# Patient Record
Sex: MALE | Race: WHITE | HISPANIC OR LATINO | Employment: PART TIME | URBAN - METROPOLITAN AREA
[De-identification: names, ages, dates, MRNs, and addresses within clinical notes are randomized per-mention and may not be internally consistent; named-entity substitution may affect disease eponyms.]

---
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
## 2021-01-17 ENCOUNTER — HOSPITAL ENCOUNTER (EMERGENCY)
Facility: MEDICAL CENTER | Age: 21
End: 2021-01-17
Attending: EMERGENCY MEDICINE
Payer: COMMERCIAL

## 2021-01-17 VITALS
TEMPERATURE: 97.3 F | RESPIRATION RATE: 21 BRPM | OXYGEN SATURATION: 96 % | DIASTOLIC BLOOD PRESSURE: 55 MMHG | HEART RATE: 95 BPM | SYSTOLIC BLOOD PRESSURE: 117 MMHG

## 2021-01-17 DIAGNOSIS — F10.921 ACUTE ALCOHOLIC INTOXICATION WITH DELIRIUM (HCC): ICD-10-CM

## 2021-01-17 PROCEDURE — 96375 TX/PRO/DX INJ NEW DRUG ADDON: CPT

## 2021-01-17 PROCEDURE — 700111 HCHG RX REV CODE 636 W/ 250 OVERRIDE (IP): Performed by: EMERGENCY MEDICINE

## 2021-01-17 PROCEDURE — 96374 THER/PROPH/DIAG INJ IV PUSH: CPT

## 2021-01-17 PROCEDURE — 99285 EMERGENCY DEPT VISIT HI MDM: CPT

## 2021-01-17 RX ORDER — ONDANSETRON 2 MG/ML
4 INJECTION INTRAMUSCULAR; INTRAVENOUS ONCE
Status: COMPLETED | OUTPATIENT
Start: 2021-01-17 | End: 2021-01-17

## 2021-01-17 RX ORDER — NALOXONE HYDROCHLORIDE 1 MG/ML
2 INJECTION INTRAMUSCULAR; INTRAVENOUS; SUBCUTANEOUS ONCE
Status: COMPLETED | OUTPATIENT
Start: 2021-01-17 | End: 2021-01-17

## 2021-01-17 RX ORDER — NALOXONE HYDROCHLORIDE 0.4 MG/ML
2 INJECTION, SOLUTION INTRAMUSCULAR; INTRAVENOUS; SUBCUTANEOUS ONCE
Status: DISCONTINUED | OUTPATIENT
Start: 2021-01-17 | End: 2021-01-17

## 2021-01-17 RX ADMIN — NALOXONE HYDROCHLORIDE 2 MG: 1 INJECTION PARENTERAL at 05:01

## 2021-01-17 RX ADMIN — ONDANSETRON 4 MG: 2 INJECTION INTRAMUSCULAR; INTRAVENOUS at 05:01

## 2021-01-17 NOTE — ED NOTES
Pt given discharge paperwork and verbalizes understanding. PIV removed, and pt shown to exit and picked up by his parents from ER lobby, pt leaving in no distress.

## 2021-01-17 NOTE — ED NOTES
This RN assisted the pt to the restroom to void. Pt was able to walk with steady gait without need for assistance. This RN confirmed with MD Maxwell that he is okay with pt going home with parents now that he is sober enough to walk on his own.

## 2021-01-17 NOTE — ED NOTES
Pt sleeping comfortably in bed. Even and unlabored respirations noted, VSS on the monitor on room air

## 2021-01-17 NOTE — ED NOTES
This RN woke pt up with light shaking and verbal stimuli. Pt woke up and was confused about where he was and how he got here. Pt is alert to self, place, and time after reorientation to where he is. Pt states that he wants to call his parents to pick him up.

## 2021-01-17 NOTE — ED PROVIDER NOTES
ED Provider Note    Scribed for Ivan Maxwell M.D. by Hugh Hernandez. 1/17/2021  4:42 AM    Primary care provider: Luca Munguia D.O.  Means of arrival: EMS  History obtained from: EMS/Patient  History limited by: Alcohol intoxication    CHIEF COMPLAINT  Chief Complaint   Patient presents with   • Alcohol Intoxication   • N/V     Found by passer-by in parking lot vomiting.        HPI  Roderick Castaneda is a 20 y.o. male who presents to the Emergency Department brought in by EMS for acute alcohol intoxication. Per EMS, the patient was found by a passer-by vomiting in a parking lot. He is responsive to pain, but otherwise does not answer questions. There are no obvious signs of trauma.    HPI limited secondary to patient's alcohol intoxication     REVIEW OF SYSTEMS  Pertinent positives include alcohol intoxication.   Pertinent negatives include no obvious signs of trauma.    See HPI for further details.     ROS limited secondary to patient's alcohol intoxication       PAST MEDICAL HISTORY   has a past medical history of Anesthesia (08/2018), ASTHMA (08/2018), Cold, and Pain (08/2018).    SURGICAL HISTORY   has a past surgical history that includes acl repair (Left, 07/21/2016); dental surgery (06/2017); shoulder arthroscopy w/ slap / labral repair (Left, 8/30/2018); and shoulder arthroscopy w/ rotator cuff repair (8/30/2018).    SOCIAL HISTORY  Social History     Tobacco Use   • Smoking status: Never Smoker   • Smokeless tobacco: Never Used   Substance Use Topics   • Alcohol use: No   • Drug use: No      Social History     Substance and Sexual Activity   Drug Use No       FAMILY HISTORY  No family history on file.    CURRENT MEDICATIONS  Current Outpatient Medications   Medication Instructions   • albuterol (PROAIR HFA) 108 (90 Base) MCG/ACT Aero Soln inhalation aerosol 2 Puffs, Inhalation, EVERY 6 HOURS PRN   • amphetamine-dextroamphetamine (ADDERALL XR) 20 MG per XR capsule 20 mg, Oral, EVERY DAY          ALLERGIES  No Known Allergies    PHYSICAL EXAM  VITAL SIGNS: /55   Pulse 87   Temp 36.3 °C (97.3 °F) (Temporal)   Resp 19   SpO2 95%     Nursing note and vitals reviewed.  Constitutional: Well-developed and well-nourished.  HENT: Head is normocephalic and atraumatic. Oropharynx is clear and moist without exudate or erythema.   Eyes: Pupils are equal, round, and reactive to light. Conjunctiva are normal.   Cardiovascular: Normal rate and regular rhythm. No murmur heard. Normal radial pulses.  Pulmonary/Chest: Breath sounds normal. No wheezes or rales.   Abdominal: Soft and non-tender. No distention    Musculoskeletal: Extremities exhibit normal range of motion without edema or tenderness.   Neurological: Awake, alert and oriented to person, place, and time. No focal deficits noted.  Skin: Skin is warm and dry. No rash.   Psychiatric: Grossly intoxicated.     COURSE & MEDICAL DECISION MAKING  Nursing notes, VS, PMSFHx reviewed in chart.     4:42 AM - Patient seen and examined at bedside. Patient will be treated with Zofran 4 mg and Narcan 2 mg. The differential diagnoses include but are not limited to: Alcohol intoxication    8:52 AM - Patient road tested by RN; patient able to ambulate with a steady gait and would like to call his parents and go home. Return precautions were discussed with the patient, and they were cleared for discharge at this time. Patient was understanding and agreeable to discharge.    Patient presents today with alcohol intoxication.  He sobered up in the emergency department.  He is able to ambulate with a stable gait.  Discharged home in improved condition.    The patient will return for new or worsening symptoms and is stable at the time of discharge.    The patient is referred to a primary physician for blood pressure management, diabetic screening, and for all other preventative health concerns.    DISPOSITION:  Patient will be discharged home in stable  condition.    FOLLOW UP:  Luca Munguia D.O.  [address] [place] [id number] [phone]    Schedule an appointment as soon as possible for a visit       [facility], Emergency Dept  [address] [facility] [address] [phone]    If symptoms worsen      OUTPATIENT MEDICATIONS:  New Prescriptions    No medications on file       FINAL IMPRESSION  1. Acute alcoholic intoxication with delirium (HCC)          IHugh (Scribe), am scribing for, and in the presence of, Ivan Maxwell M.D..    Electronically signed by: Hugh Hernandez (Bessibvera), 1/17/2021    IIvan M.D. personally performed the services described in this documentation, as scribed by Hugh Hernandez in my presence, and it is both accurate and complete. C.    The note accurately reflects work and decisions made by me.  Ivan Maxwell M.D.  1/17/2021  10:34 AM

## 2022-04-11 ENCOUNTER — OFFICE VISIT (OUTPATIENT)
Dept: URGENT CARE | Facility: PHYSICIAN GROUP | Age: 22
End: 2022-04-11
Payer: COMMERCIAL

## 2022-04-11 VITALS
HEART RATE: 68 BPM | HEIGHT: 72 IN | RESPIRATION RATE: 18 BRPM | SYSTOLIC BLOOD PRESSURE: 118 MMHG | TEMPERATURE: 97.5 F | WEIGHT: 180 LBS | OXYGEN SATURATION: 98 % | DIASTOLIC BLOOD PRESSURE: 72 MMHG | BODY MASS INDEX: 24.38 KG/M2

## 2022-04-11 DIAGNOSIS — L03.316 CELLULITIS OF UMBILICUS: ICD-10-CM

## 2022-04-11 PROCEDURE — 99203 OFFICE O/P NEW LOW 30 MIN: CPT | Performed by: FAMILY MEDICINE

## 2022-04-11 RX ORDER — CEPHALEXIN 500 MG/1
500 CAPSULE ORAL 4 TIMES DAILY
Qty: 20 CAPSULE | Refills: 0 | Status: SHIPPED | OUTPATIENT
Start: 2022-04-11 | End: 2022-04-16

## 2022-04-11 ASSESSMENT — ENCOUNTER SYMPTOMS
COUGH: 0
DIZZINESS: 0
SORE THROAT: 0
SHORTNESS OF BREATH: 0
FEVER: 0
NAUSEA: 0
MYALGIAS: 0
CHILLS: 0
EASY BRUISING: 1
VOMITING: 0

## 2022-04-11 NOTE — PROGRESS NOTES
Subjective:   Roderick Castaneda is a 21 y.o. male who presents for Bleeding/Bruising (Belly button for 3 months. )        Rash  This is a new (Complains of redness, swelling at the umbilicus over the past 3 months, denies recollection of specific injury, reports intermittent drainage) problem. The problem is unchanged. Location: Umbilicus. The rash is characterized by redness and swelling. He was exposed to nothing. Pertinent negatives include no cough, fever, shortness of breath, sore throat or vomiting. Treatments tried: Routine skin hygiene, reports previous wound culture performed at outside facility. The treatment provided no relief.     PMH:  has a past medical history of Anesthesia (08/2018), ASTHMA (08/2018), Cold, and Pain (08/2018).  MEDS:   Current Outpatient Medications:   •  cephALEXin (KEFLEX) 500 MG Cap, Take 1 Capsule by mouth 4 times a day for 5 days., Disp: 20 Capsule, Rfl: 0  •  mupirocin (BACTROBAN) 2 % Ointment, Apply 1 Application topically 2 times a day for 14 days., Disp: 30 g, Rfl: 1  •  albuterol (PROAIR HFA) 108 (90 Base) MCG/ACT Aero Soln inhalation aerosol, Inhale 2 Puffs by mouth every 6 hours as needed for Shortness of Breath., Disp: 8.5 g, Rfl: 3  ALLERGIES: No Known Allergies  SURGHX:   Past Surgical History:   Procedure Laterality Date   • SHOULDER ARTHROSCOPY W/ SLAP / LABRAL REPAIR Left 8/30/2018    Procedure: SHOULDER ARTHROSCOPY W/ SLAP / LABRAL REPAIR-  POSTERIOR LABRAL;  Surgeon: Liang Underwood M.D.;  Location: [facility];  Service: Orthopedics   • SHOULDER ARTHROSCOPY W/ ROTATOR CUFF REPAIR  8/30/2018    Procedure: SHOULDER ARTHROSCOPY W/ ROTATOR CUFF REPAIR-  POSSIBLE, PROCEED AS INDICATED;  Surgeon: Liang Underwood M.D.;  Location: [facility];  Service: Orthopedics   • DENTAL SURGERY  06/2017    wisdom teeth   • REPAIR, KNEE, ACL Left 07/21/2016    Repeat acl sugery 3 weeks after original surgery     SOCHX:  reports that he has  never smoked. He has never used smokeless tobacco. He reports that he does not drink alcohol and does not use drugs.  FH: History reviewed. No pertinent family history.  Review of Systems   Constitutional: Negative for chills and fever.   HENT: Negative for sore throat.    Respiratory: Negative for cough and shortness of breath.    Gastrointestinal: Negative for nausea and vomiting.   Musculoskeletal: Negative for myalgias.   Skin: Positive for rash.   Neurological: Negative for dizziness.        Objective:   /72   Pulse 68   Temp 36.4 °C (97.5 °F) (Temporal)   Resp 18   Ht 1.829 m (6')   Wt 81.6 kg (180 lb)   SpO2 98%   BMI 24.41 kg/m²   Physical Exam  Vitals and nursing note reviewed.   Constitutional:       General: He is not in acute distress.     Appearance: He is well-developed.   HENT:      Head: Normocephalic and atraumatic.      Right Ear: External ear normal.      Left Ear: External ear normal.      Nose: Nose normal.      Mouth/Throat:      Mouth: Mucous membranes are moist.   Eyes:      Conjunctiva/sclera: Conjunctivae normal.   Cardiovascular:      Rate and Rhythm: Normal rate.   Pulmonary:      Effort: Pulmonary effort is normal. No respiratory distress.      Breath sounds: Normal breath sounds.   Abdominal:      General: There is no distension.      Tenderness: There is no abdominal tenderness. There is no guarding.       Musculoskeletal:         General: Normal range of motion.   Skin:     General: Skin is warm and dry.   Neurological:      General: No focal deficit present.      Mental Status: He is alert and oriented to person, place, and time. Mental status is at baseline.      Gait: Gait (gait at baseline) normal.   Psychiatric:         Judgment: Judgment normal.           Assessment/Plan:   1. Cellulitis of umbilicus  - cephALEXin (KEFLEX) 500 MG Cap; Take 1 Capsule by mouth 4 times a day for 5 days.  Dispense: 20 Capsule; Refill: 0  - mupirocin (BACTROBAN) 2 % Ointment; Apply 1  Application topically 2 times a day for 14 days.  Dispense: 30 g; Refill: 1        Medical Decision Making/Course:  In the course of preparing for this visit with review of the pertinent past medical history, recent and past clinic visits, current medications, and performing chart, immunization, medical history and medication reconciliation, and in the further course of obtaining the current history pertinent to the clinic visit today, performing an exam and evaluation, ordering and independently evaluating labs, tests  , and/or procedures, prescribing any recommended new medications as noted above, providing any pertinent counseling and education and recommending further coordination of care including recommendations for symptomatic and supportive measures, at least  20 minutes of total time were spent during this encounter.      Discussed close monitoring, return precautions, and supportive measures of maintaining adequate fluid hydration and caloric intake, relative rest and symptom management as needed for pain and/or fever.    Differential diagnosis, natural history, supportive care, and indications for immediate follow-up discussed.     Advised the patient to follow-up with the primary care physician for recheck, reevaluation, and consideration of further management.    Please note that this dictation was created using voice recognition software. I have worked with consultants from the vendor as well as technical experts from [facility] [employer] to optimize the interface. I have made every reasonable attempt to correct obvious errors, but I expect that there are errors of grammar and possibly content that I did not discover before finalizing the note.

## 2022-10-14 ENCOUNTER — OFFICE VISIT (OUTPATIENT)
Dept: URGENT CARE | Facility: PHYSICIAN GROUP | Age: 22
End: 2022-10-14
Payer: COMMERCIAL

## 2022-10-14 VITALS
WEIGHT: 229.7 LBS | BODY MASS INDEX: 31.11 KG/M2 | HEART RATE: 80 BPM | RESPIRATION RATE: 16 BRPM | HEIGHT: 72 IN | OXYGEN SATURATION: 96 % | SYSTOLIC BLOOD PRESSURE: 128 MMHG | TEMPERATURE: 98.8 F | DIASTOLIC BLOOD PRESSURE: 90 MMHG

## 2022-10-14 DIAGNOSIS — J45.21 MILD INTERMITTENT ASTHMA WITH ACUTE EXACERBATION: ICD-10-CM

## 2022-10-14 PROCEDURE — 99214 OFFICE O/P EST MOD 30 MIN: CPT | Performed by: STUDENT IN AN ORGANIZED HEALTH CARE EDUCATION/TRAINING PROGRAM

## 2022-10-14 RX ORDER — PREDNISONE 20 MG/1
40 TABLET ORAL DAILY
Qty: 10 TABLET | Refills: 0 | Status: SHIPPED | OUTPATIENT
Start: 2022-10-14 | End: 2022-10-19

## 2022-10-14 RX ORDER — INHALER, ASSIST DEVICES
1 SPACER (EA) MISCELLANEOUS ONCE
Qty: 1 EACH | Refills: 0 | Status: SHIPPED | OUTPATIENT
Start: 2022-10-14 | End: 2022-10-14

## 2022-10-15 NOTE — PROGRESS NOTES
Subjective:   CHIEF COMPLAINT  Chief Complaint   Patient presents with    Flu Like Symptoms     Flu shot reaction, trouble breathing, runny nose, stuffy. Throat hurt in mornings, Tested for covid at home for covid (-).        HPI  Roderick Castaneda is a 22 y.o. male who presents with a chief complaint of runny nose, postnasal drip, sore throat, and cough.  Symptoms started approximately 2 days ago.  He has tried using some albuterol which is provided some relief.  Symptoms have been keeping him awake at night associated with shortness of breath and wheezing.  He has a PMH of asthma and current symptoms are consistent with previous exacerbations.  He has not had any fevers.  Home COVID test was negative.  Vaccine against COVID x2.    REVIEW OF SYSTEMS  General: no fever or chills  GI: no nausea or vomiting  See HPI for further details.    PAST MEDICAL HISTORY  Patient Active Problem List    Diagnosis Date Noted    ASTHMA 01/19/2011       SURGICAL HISTORY   has a past surgical history that includes repair, knee, acl (Left, 07/21/2016); dental surgery (06/2017); shoulder arthroscopy w/ slap / labral repair (Left, 8/30/2018); and shoulder arthroscopy w/ rotator cuff repair (8/30/2018).    ALLERGIES  No Known Allergies    CURRENT MEDICATIONS  Home Medications       Reviewed by Maribel Reyes-Garcia, Med Ass'kvng (Medical Assistant) on 10/14/22 at 1806  Med List Status: <None>     Medication Last Dose Status   albuterol (PROAIR HFA) 108 (90 Base) MCG/ACT Aero Soln inhalation aerosol PRN Active                    SOCIAL HISTORY  Social History     Tobacco Use    Smoking status: Never    Smokeless tobacco: Never   Vaping Use    Vaping Use: Never used   Substance and Sexual Activity    Alcohol use: No    Drug use: No    Sexual activity: Never       FAMILY HISTORY  No family history on file.       Objective:   PHYSICAL EXAM  VITAL SIGNS: BP (!) 128/90   Pulse 80   Temp 37.1 °C (98.8 °F)   Resp 16   Ht 1.829 m (6')   Wt 104  kg (229 lb 11.2 oz)   SpO2 96%   BMI 31.15 kg/m²     Gen: no acute distress, normal voice  Skin: dry, intact, moist mucosal membranes  Eyes: No conjunctival injection bilaterally.  Neck: Normal range of motion. No meningeal signs.   Lungs: Diffuse wheezing throughout without any rhonchi or crackles.  Symmetric expansion.    CV: RRR w/o murmurs or clicks  Psych: normal affect, normal judgement, alert, awake    Assessment/Plan:     1. Mild intermittent asthma with acute exacerbation  predniSONE (DELTASONE) 20 MG Tab    Spacer/Aero-Holding Chambers (AEROCHAMBER PLUS-FLOW SIGNAL) Misc      Signs and symptoms consistent with asthma exacerbation.  Chronic issue with acute exacerbation.  Patient otherwise was very well-appearing, nontoxic and speaking in full sentences.  Oxygen is at 96%.  No red flags.  Patient currently has albuterol HFA at home but does not use a spacer.  - Prednisone 40 mg p.o. daily x5 days sent to his pharmacy  - Recommended albuterol scheduled for the next 24 hours then as needed.  Ordered Rx for spacer.  Proper technique discussed  - Return to urgent care any new/worsening symptoms or further questions or concerns.  Patient understood everything discussed.  All questions were answered.    Differential diagnosis, natural history, supportive care, and indications for immediate follow-up discussed. All questions answered. Patient agrees with the plan of care.    Follow-up as needed if symptoms worsen or fail to improve to PCP, Urgent care or Emergency Room.    Please note that this dictation was created using voice recognition software. I have made a reasonable attempt to correct obvious errors, but I expect that there are errors of grammar and possibly content that I did not discover before finalizing the note.

## 2023-06-29 ENCOUNTER — HOSPITAL ENCOUNTER (OUTPATIENT)
Dept: RADIOLOGY | Facility: MEDICAL CENTER | Age: 23
End: 2023-06-29
Attending: FAMILY MEDICINE
Payer: COMMERCIAL

## 2023-06-29 DIAGNOSIS — N50.812 LEFT TESTICULAR PAIN: ICD-10-CM

## 2023-06-29 PROCEDURE — 76870 US EXAM SCROTUM: CPT

## 2023-09-28 ENCOUNTER — IMMUNIZATION (OUTPATIENT)
Dept: OCCUPATIONAL MEDICINE | Facility: CLINIC | Age: 23
End: 2023-09-28

## 2023-09-28 DIAGNOSIS — Z23 NEED FOR VACCINATION: Primary | ICD-10-CM

## 2023-09-28 PROCEDURE — 90686 IIV4 VACC NO PRSV 0.5 ML IM: CPT | Performed by: PREVENTIVE MEDICINE

## 2024-10-17 ENCOUNTER — OFFICE VISIT (OUTPATIENT)
Dept: URGENT CARE | Facility: PHYSICIAN GROUP | Age: 24
End: 2024-10-17
Payer: COMMERCIAL

## 2024-10-17 VITALS
HEART RATE: 94 BPM | DIASTOLIC BLOOD PRESSURE: 86 MMHG | OXYGEN SATURATION: 97 % | BODY MASS INDEX: 35.01 KG/M2 | WEIGHT: 258.5 LBS | TEMPERATURE: 97.5 F | HEIGHT: 72 IN | RESPIRATION RATE: 14 BRPM | SYSTOLIC BLOOD PRESSURE: 130 MMHG

## 2024-10-17 DIAGNOSIS — J06.9 VIRAL URI WITH COUGH: Primary | ICD-10-CM

## 2024-10-17 DIAGNOSIS — J45.901 EXACERBATION OF ASTHMA, UNSPECIFIED ASTHMA SEVERITY, UNSPECIFIED WHETHER PERSISTENT: ICD-10-CM

## 2024-10-17 LAB
FLUAV RNA SPEC QL NAA+PROBE: NEGATIVE
FLUBV RNA SPEC QL NAA+PROBE: NEGATIVE
RSV RNA SPEC QL NAA+PROBE: NEGATIVE
SARS-COV-2 RNA RESP QL NAA+PROBE: NEGATIVE

## 2024-10-17 PROCEDURE — 99214 OFFICE O/P EST MOD 30 MIN: CPT

## 2024-10-17 PROCEDURE — 0241U POCT CEPHEID COV-2, FLU A/B, RSV - PCR: CPT

## 2024-10-17 PROCEDURE — 3075F SYST BP GE 130 - 139MM HG: CPT

## 2024-10-17 PROCEDURE — 3079F DIAST BP 80-89 MM HG: CPT

## 2024-10-17 RX ORDER — BENZONATATE 100 MG/1
100 CAPSULE ORAL 3 TIMES DAILY PRN
Qty: 60 CAPSULE | Refills: 0 | Status: SHIPPED | OUTPATIENT
Start: 2024-10-17

## 2024-10-17 RX ORDER — FLUTICASONE PROPIONATE AND SALMETEROL 250; 50 UG/1; UG/1
1 POWDER RESPIRATORY (INHALATION) EVERY 12 HOURS
COMMUNITY

## 2024-10-17 RX ORDER — LISDEXAMFETAMINE DIMESYLATE 40 MG/1
CAPSULE ORAL
COMMUNITY
End: 2024-02-17

## 2024-10-17 RX ORDER — PREDNISONE 10 MG/1
20 TABLET ORAL DAILY
Qty: 6 TABLET | Refills: 0 | Status: SHIPPED | OUTPATIENT
Start: 2024-10-17 | End: 2024-10-20

## 2024-10-17 RX ORDER — DEXTROAMPHETAMINE SACCHARATE, AMPHETAMINE ASPARTATE MONOHYDRATE, DEXTROAMPHETAMINE SULFATE AND AMPHETAMINE SULFATE 7.5; 7.5; 7.5; 7.5 MG/1; MG/1; MG/1; MG/1
30 CAPSULE, EXTENDED RELEASE ORAL EVERY MORNING
COMMUNITY

## 2024-10-17 ASSESSMENT — ENCOUNTER SYMPTOMS
MYALGIAS: 1
EYE REDNESS: 0
DOUBLE VISION: 0
DIZZINESS: 0
NAUSEA: 0
BRUISES/BLEEDS EASILY: 0
FEVER: 0
STRIDOR: 0
SHORTNESS OF BREATH: 0
DIARRHEA: 0
ABDOMINAL PAIN: 0
WHEEZING: 0
PALPITATIONS: 0
SPUTUM PRODUCTION: 1
DEPRESSION: 0
EYE DISCHARGE: 0
PHOTOPHOBIA: 0
BLURRED VISION: 0
HEMOPTYSIS: 0
SORE THROAT: 1
HEADACHES: 0
HEARTBURN: 0
CHILLS: 1
VOMITING: 0
FOCAL WEAKNESS: 0
DIAPHORESIS: 0
COUGH: 1
EYE PAIN: 0

## 2024-11-15 ENCOUNTER — APPOINTMENT (OUTPATIENT)
Dept: OCCUPATIONAL MEDICINE | Facility: CLINIC | Age: 24
End: 2024-11-15

## (undated) DEVICE — GLOVE BIOGEL SZ 7 SURGICAL PF LTX - (50PR/BX 4BX/CA)

## (undated) DEVICE — SHAVER4.0 AGGRESSIVE + FORMLA (5EA/BX)

## (undated) DEVICE — ELECTRODE DUAL RETURN W/ CORD - (50/PK)

## (undated) DEVICE — SUTURE LASSO CRESENT

## (undated) DEVICE — CANNULA TWIST IN 8.25MM X 7CM (5/BX)

## (undated) DEVICE — SODIUM CHL IRRIGATION 0.9% 1000ML (12EA/CA)

## (undated) DEVICE — GLOVE BIOGEL SZ 6.5 SURGICAL PF LTX (50PR/BX 4BX/CA)

## (undated) DEVICE — KIT ROOM DECONTAMINATION

## (undated) DEVICE — GLOVE BIOGEL INDICATOR SZ 7SURGICAL PF LTX - (50/BX 4BX/CA)

## (undated) DEVICE — GLOVE BIOGEL INDICATOR SZ 8 SURGICAL PF LTX - (50/BX 4BX/CA)

## (undated) DEVICE — Device

## (undated) DEVICE — CLOSURE SKIN STRIP 1/2 X 4 IN - (STERI STRIP) (50/BX 4BX/CA)

## (undated) DEVICE — SUTURE PLASTIC

## (undated) DEVICE — SUCTION INSTRUMENT YANKAUER BULBOUS TIP W/O VENT (50EA/CA)

## (undated) DEVICE — SHAVER 5.5 RESECTOR FORMULA (5EA/BX )

## (undated) DEVICE — PACK SHOULDER ARTHROSCOPY SM - (2EA/CA)

## (undated) DEVICE — MASK ANESTHESIA ADULT  - (100/CA)

## (undated) DEVICE — TUBE E-T HI-LO CUFF 7.0MM (10EA/PK)

## (undated) DEVICE — NEPTUNE 4 PORT MANIFOLD - (20/PK)

## (undated) DEVICE — GOWN WARMING STANDARD FLEX - (30/CA)

## (undated) DEVICE — LACTATED RINGERS INJ 1000 ML - (14EA/CA 60CA/PF)

## (undated) DEVICE — SUTURE 3-0 PROLENE PS-1 (12PK/BX)

## (undated) DEVICE — HEAD HOLDER JUNIOR/ADULT

## (undated) DEVICE — GUIDE TRACHE TUBE INTUBATING STYLET 5.0-10.0MM 14FR (20EA/PK)

## (undated) DEVICE — PROTECTOR ULNA NERVE - (36PR/CA)

## (undated) DEVICE — NEEDLE W/FACET TIP DULL VERSION W/STIMULATION CABLE SONOPLEX 21G X 4 (10/EA)"

## (undated) DEVICE — CANNULA KIT UNIV GREY - (10/BX)

## (undated) DEVICE — KIT DISPOSABLE HIP FOR 1.8MM IMPLANT INCLUDES DRILL DRILL GUIDE AND OBTURATOR

## (undated) DEVICE — GLOVE BIOGEL SZ 8 SURGICAL PF LTX - (50PR/BX 4BX/CA)

## (undated) DEVICE — ARTHROWAND TURBOVAC 3.5/90 SCT

## (undated) DEVICE — SENSOR SPO2 NEO LNCS ADHESIVE (20/BX) SEE USER NOTES

## (undated) DEVICE — WATER IRRIGATION STERILE 1000ML (12EA/CA)

## (undated) DEVICE — TUBE CONNECTING SUCTION - CLEAR PLASTIC STERILE 72 IN (50EA/CA)

## (undated) DEVICE — SPIDER SHOULDER HOLDER (12EA/BX)

## (undated) DEVICE — GLOVE BIOGEL INDICATOR SZ 6.5 SURGICAL PF LTX - (50PR/BX 4BX/CA)

## (undated) DEVICE — BLOCK

## (undated) DEVICE — DRAPETIBURON SHOULDER W/POUCH - (5EA/CA)

## (undated) DEVICE — KIT ANESTHESIA W/CIRCUIT & 3/LT BAG W/FILTER (20EA/CA)

## (undated) DEVICE — TUBING PATIENT W/CONNECTOR REDEUCE (1EA)

## (undated) DEVICE — CANISTER SUCTION RIGID RED 1500CC (40EA/CA)

## (undated) DEVICE — BAG, SPONGE COUNT 50600

## (undated) DEVICE — CHLORAPREP 26 ML APPLICATOR - ORANGE TINT(25/CA)

## (undated) DEVICE — ELECTRODE 850 FOAM ADHESIVE - HYDROGEL RADIOTRNSPRNT (50/PK)

## (undated) DEVICE — GLOVE, LITE (PAIR)

## (undated) DEVICE — SODIUM CHL. IRRIGATION 0.9% 3000ML (4EA/CA 65CA/PF)

## (undated) DEVICE — TUBING PUMP WITH CONNECTOR REDEUCE (1EA)